# Patient Record
Sex: MALE | Race: WHITE | URBAN - METROPOLITAN AREA
[De-identification: names, ages, dates, MRNs, and addresses within clinical notes are randomized per-mention and may not be internally consistent; named-entity substitution may affect disease eponyms.]

---

## 2021-11-20 ENCOUNTER — HOSPITAL ENCOUNTER (EMERGENCY)
Facility: CLINIC | Age: 46
Discharge: HOME | End: 2021-11-20
Attending: EMERGENCY MEDICINE | Admitting: EMERGENCY MEDICINE
Payer: COMMERCIAL

## 2021-11-20 VITALS
RESPIRATION RATE: 18 BRPM | DIASTOLIC BLOOD PRESSURE: 113 MMHG | OXYGEN SATURATION: 95 % | HEART RATE: 117 BPM | SYSTOLIC BLOOD PRESSURE: 140 MMHG | TEMPERATURE: 98.4 F

## 2021-11-20 DIAGNOSIS — F10.920 ALCOHOLIC INTOXICATION WITHOUT COMPLICATION (H): ICD-10-CM

## 2021-11-20 PROCEDURE — 250N000013 HC RX MED GY IP 250 OP 250 PS 637: Performed by: EMERGENCY MEDICINE

## 2021-11-20 PROCEDURE — 99283 EMERGENCY DEPT VISIT LOW MDM: CPT

## 2021-11-20 RX ORDER — DIAZEPAM 5 MG
5 TABLET ORAL ONCE
Status: COMPLETED | OUTPATIENT
Start: 2021-11-20 | End: 2021-11-20

## 2021-11-20 RX ADMIN — DIAZEPAM 5 MG: 5 TABLET ORAL at 10:07

## 2021-11-20 ASSESSMENT — ENCOUNTER SYMPTOMS
FEVER: 0
EYE REDNESS: 0
ABDOMINAL PAIN: 0
SHORTNESS OF BREATH: 0
SEIZURES: 0
NECK PAIN: 0

## 2021-11-20 NOTE — ED PROVIDER NOTES
History   Chief Complaint:  Alcohol Problem    HPI   Nico Tay is a 46 year old male with history of alcohol abuse, depression, hypertension, and hypercholesterolemia who presents with an alcohol problem. The patient was in the ED yesterday after falling in the shower while intoxicated. He is from [place], and was supposed to go to [place] yesterday for detox, but is not interested in receiving help for his alcoholism. He was brought back to the ED today by a friend for continued drinking, though he remains uninterested in seeking any sort of help. It sounds like Nico was sober for 11 years up until about two weeks ago. Denies any suicidal or homicidal ideation.    CT Head Brain without Contrast - 11/19/2021  No acute intracranial process     Review of Systems   Constitutional: Negative for fever.   Eyes: Negative for redness.   Respiratory: Negative for shortness of breath.    Cardiovascular: Negative for chest pain.   Gastrointestinal: Negative for abdominal pain.   Musculoskeletal: Negative for neck pain.   Neurological: Negative for seizures.   All other systems reviewed and are negative.    Allergies:  The patient has no known allergies.     Medications:  Naprosyn  Flexeril  Wellbutrin  Lexapro  Lipitor  Albuterol  Pepcid  Prinivil  Ambien  Advair     Past Medical History:    Stenosis of ureter  Calculus of ureter  Insomnia  hypertension  Hypercholesterolemia  Depression  Asthma    Depression  Alcohol abuse     Past Surgical History:    Ureteral stent placement, left      Family History:    Father: lung cancer    Social History:  The patient presents to the ED with a male .     Physical Exam     Patient Vitals for the past 24 hrs:   BP Temp Temp src Pulse Resp SpO2   11/20/21 1010 -- 98.4  F (36.9  C) Temporal -- -- --   11/20/21 0954 -- -- -- -- 18 --   11/20/21 0947 -- -- -- -- -- 95 %   11/20/21 0946 (!) 140/113 -- -- 117 -- 95 %       Physical Exam  Physical Exam   General:  Standing  by bed with friend, Lui, at bedside.   HENT:  No obvious trauma to head other then abrasion to left forehead.  Right Ear:  External ear normal.   Left Ear:  External ear normal.   Nose:  Nose normal.   Eyes:  Conjunctivae and EOM are normal. Pupils are equal, round, and reactive.   Neck: Normal range of motion. Neck supple. No tracheal deviation present.   Pulm/Chest: Effort normal  M/S: Normal range of motion.   Neuro: Alert. GCS 15. Slurring words, intoxicated appearing, but able to ambulate on own.  Skin: Skin is warm and dry. No rash noted. Not diaphoretic.   Psych: Normal mood and affect. Behavior is normal.     Emergency Department Course   Emergency Department Course:    Reviewed:  I reviewed nursing notes, vitals, past medical history and care everywhere    Assessments:  0949 I obtained history and examined the patient as noted above.   1005 I rechecked the patient and explained the plan.     Interventions:  Valium 5 mg PO    Disposition:  The patient was discharged to with his friend, Lui to [facility].     Impression & Plan   Medical Decision Making:  Nico Tay is a very pleasant 46 year old year old patient who presents to the emergency department with concern of alcohol intoxication.  The patient is from [place].  They drove down to to the [facility] to [facility].  There was a challenge getting in and the bed was initially taken away.  The patient had a fall with a head abrasion yesterday.  He was seen at the [facility] and underwent a head CT that was unremarkable.  He was then brought to [facility] yesterday afternoon where his alcohol was 0.27 and then discharged. The patient has no medical concerns at this time.  His friend, Lui, brought him to the ED as [facility] did not have a treatment bed available for him.  While here in the ED, a bed became available at the [facility] and his friend Lui desired to take him there.  The  patient was initially uncooperative, but after talking with the patient he was amenable to taking 1 tablet of Valium to help with the withdrawal symptoms that he is experiencing and then he was willing to go to the treatment center.  The treatment center called and requested a copy of his negative head CT from yesterday to be faxed to them in order for them to accept him in their treatment facility which was then performed.  The patient had no medical concerns he wanted addressed.  The patient is able to ambulate on his own and perform his own ADLs and his friend Lui feels comfortable taking him there.    The treatment plan was discussed with the patient and they expressed understanding of this plan and consented to the plan.  In addition, the patient will return to the emergency department if their symptoms persist, worsen, if new symptoms arise or if there is any concern as other pathology may be present that is not evident at this time. They also understand the importance of close follow up in the clinic and if unable to do so will return to the emergency department for a reevaluation. All questions were answered.    Covid-19  Nico Tay was evaluated during a global COVID-19 pandemic, which necessitated consideration that the patient might be at risk for infection with the SARS-CoV-2 virus that causes COVID-19.   Applicable protocols for evaluation were followed during the patient's care.   COVID-19 was considered as part of the patient's evaluation.    Diagnosis:    ICD-10-CM    1. Alcoholic intoxication without complication (H)  F10.920        Discharge Medications:  There are no discharge medications for this patient.      Scribe Disclosure:  Christopher HOOK, am serving as a scribe at 9:54 AM on 11/20/2021 to document services personally performed by Panchito Saunders DO based on my observations and the provider's statements to me.      Panchito Saunders DO  11/20/21 1020

## 2021-11-20 NOTE — ED TRIAGE NOTES
Pt presents for alcohol detox- was supposed to go to [facility] yesterday and is currently on the list but no bed available, sent to ED for eval